# Patient Record
Sex: FEMALE | Race: WHITE | NOT HISPANIC OR LATINO | URBAN - METROPOLITAN AREA
[De-identification: names, ages, dates, MRNs, and addresses within clinical notes are randomized per-mention and may not be internally consistent; named-entity substitution may affect disease eponyms.]

---

## 2020-05-31 ENCOUNTER — AMBULATORY - HEALTHEAST (OUTPATIENT)
Dept: ADMINISTRATIVE | Facility: CLINIC | Age: 85
End: 2020-05-31

## 2020-05-31 RX ORDER — EZETIMIBE 10 MG/1
10 TABLET ORAL AT BEDTIME
Status: SHIPPED | COMMUNITY
Start: 2020-05-31

## 2020-05-31 RX ORDER — CITALOPRAM HYDROBROMIDE 20 MG/1
20 TABLET ORAL DAILY
Status: SHIPPED | COMMUNITY
Start: 2020-05-31

## 2020-05-31 RX ORDER — NITROGLYCERIN 0.4 MG/1
0.4 TABLET SUBLINGUAL EVERY 5 MIN PRN
Status: SHIPPED | COMMUNITY
Start: 2020-05-31

## 2020-05-31 RX ORDER — ASPIRIN 81 MG/1
81 TABLET, CHEWABLE ORAL DAILY
Status: SHIPPED | COMMUNITY
Start: 2020-05-31

## 2020-05-31 RX ORDER — NYSTATIN 100000/G
1 POWDER (GRAM) TOPICAL 2 TIMES DAILY
Status: SHIPPED | COMMUNITY
Start: 2020-05-31

## 2020-05-31 RX ORDER — LANOLIN ALCOHOL/MO/W.PET/CERES
3 CREAM (GRAM) TOPICAL AT BEDTIME
Status: SHIPPED | COMMUNITY
Start: 2020-05-31

## 2020-05-31 RX ORDER — ALBUTEROL SULFATE 90 UG/1
2 AEROSOL, METERED RESPIRATORY (INHALATION) EVERY 4 HOURS PRN
Status: SHIPPED | COMMUNITY
Start: 2020-05-31

## 2020-05-31 RX ORDER — METOPROLOL SUCCINATE 50 MG/1
100 TABLET, EXTENDED RELEASE ORAL AT BEDTIME
Status: SHIPPED | COMMUNITY
Start: 2020-05-31

## 2020-05-31 RX ORDER — INSULIN GLARGINE 100 [IU]/ML
40 INJECTION, SOLUTION SUBCUTANEOUS DAILY
Status: SHIPPED | COMMUNITY
Start: 2020-05-31

## 2020-05-31 RX ORDER — ACETAMINOPHEN 500 MG
1000 TABLET ORAL 2 TIMES DAILY
Status: SHIPPED | COMMUNITY
Start: 2020-05-31

## 2020-05-31 RX ORDER — AMLODIPINE BESYLATE 10 MG/1
10 TABLET ORAL DAILY
Status: SHIPPED | COMMUNITY
Start: 2020-05-31

## 2020-05-31 RX ORDER — CALCIUM CARBONATE 500(1250)
1 TABLET,CHEWABLE ORAL 2 TIMES DAILY
Status: SHIPPED | COMMUNITY
Start: 2020-05-31

## 2020-06-01 ENCOUNTER — OFFICE VISIT - HEALTHEAST (OUTPATIENT)
Dept: GERIATRICS | Facility: CLINIC | Age: 85
End: 2020-06-01

## 2020-06-01 DIAGNOSIS — I12.9 TYPE 2 DM WITH CKD STAGE 3 AND HYPERTENSION (H): ICD-10-CM

## 2020-06-01 DIAGNOSIS — E11.22 TYPE 2 DM WITH CKD STAGE 3 AND HYPERTENSION (H): ICD-10-CM

## 2020-06-01 DIAGNOSIS — G47.33 SEVERE OBSTRUCTIVE SLEEP APNEA: ICD-10-CM

## 2020-06-01 DIAGNOSIS — N18.30 TYPE 2 DM WITH CKD STAGE 3 AND HYPERTENSION (H): ICD-10-CM

## 2020-06-01 DIAGNOSIS — F33.9 RECURRENT MAJOR DEPRESSIVE DISORDER, REMISSION STATUS UNSPECIFIED (H): ICD-10-CM

## 2020-06-01 DIAGNOSIS — R41.0 DELIRIUM: ICD-10-CM

## 2020-06-01 DIAGNOSIS — E66.09 CLASS 1 OBESITY DUE TO EXCESS CALORIES WITH SERIOUS COMORBIDITY IN ADULT, UNSPECIFIED BMI: ICD-10-CM

## 2020-06-01 DIAGNOSIS — N18.30 CKD (CHRONIC KIDNEY DISEASE) STAGE 3, GFR 30-59 ML/MIN (H): ICD-10-CM

## 2020-06-01 DIAGNOSIS — Z86.73 HISTORY OF CVA (CEREBROVASCULAR ACCIDENT): ICD-10-CM

## 2020-06-01 DIAGNOSIS — E66.811 CLASS 1 OBESITY DUE TO EXCESS CALORIES WITH SERIOUS COMORBIDITY IN ADULT, UNSPECIFIED BMI: ICD-10-CM

## 2020-06-01 DIAGNOSIS — K21.9 GASTROESOPHAGEAL REFLUX DISEASE WITHOUT ESOPHAGITIS: ICD-10-CM

## 2020-06-01 DIAGNOSIS — M79.7 FIBROMYALGIA: ICD-10-CM

## 2020-06-01 DIAGNOSIS — R53.1 WEAKNESS: ICD-10-CM

## 2020-06-01 DIAGNOSIS — R29.6 FREQUENT FALLS: ICD-10-CM

## 2020-06-01 DIAGNOSIS — I25.10 CORONARY ARTERY DISEASE INVOLVING NATIVE CORONARY ARTERY OF NATIVE HEART WITHOUT ANGINA PECTORIS: ICD-10-CM

## 2020-06-01 DIAGNOSIS — I10 ESSENTIAL HYPERTENSION: ICD-10-CM

## 2020-06-02 ENCOUNTER — RECORDS - HEALTHEAST (OUTPATIENT)
Dept: LAB | Facility: CLINIC | Age: 85
End: 2020-06-02

## 2020-06-03 ENCOUNTER — COMMUNICATION - HEALTHEAST (OUTPATIENT)
Dept: GERIATRICS | Facility: CLINIC | Age: 85
End: 2020-06-03

## 2020-06-03 LAB
ANION GAP SERPL CALCULATED.3IONS-SCNC: 8 MMOL/L (ref 5–18)
BUN SERPL-MCNC: 31 MG/DL (ref 8–28)
CALCIUM SERPL-MCNC: 9.5 MG/DL (ref 8.5–10.5)
CHLORIDE BLD-SCNC: 107 MMOL/L (ref 98–107)
CO2 SERPL-SCNC: 25 MMOL/L (ref 22–31)
CREAT SERPL-MCNC: 1.53 MG/DL (ref 0.6–1.1)
GFR SERPL CREATININE-BSD FRML MDRD: 32 ML/MIN/1.73M2
GLUCOSE BLD-MCNC: 159 MG/DL (ref 70–125)
HGB BLD-MCNC: 11.9 G/DL (ref 12–16)
POTASSIUM BLD-SCNC: 4.1 MMOL/L (ref 3.5–5)
SODIUM SERPL-SCNC: 140 MMOL/L (ref 136–145)

## 2020-06-08 ENCOUNTER — OFFICE VISIT - HEALTHEAST (OUTPATIENT)
Dept: GERIATRICS | Facility: CLINIC | Age: 85
End: 2020-06-08

## 2020-06-08 DIAGNOSIS — I25.10 CORONARY ARTERY DISEASE INVOLVING NATIVE CORONARY ARTERY OF NATIVE HEART WITHOUT ANGINA PECTORIS: ICD-10-CM

## 2020-06-08 DIAGNOSIS — R29.6 FREQUENT FALLS: ICD-10-CM

## 2020-06-08 DIAGNOSIS — R53.1 WEAKNESS: ICD-10-CM

## 2020-06-08 DIAGNOSIS — I12.9 TYPE 2 DM WITH CKD STAGE 3 AND HYPERTENSION (H): ICD-10-CM

## 2020-06-08 DIAGNOSIS — I10 ESSENTIAL HYPERTENSION: ICD-10-CM

## 2020-06-08 DIAGNOSIS — E11.22 TYPE 2 DM WITH CKD STAGE 3 AND HYPERTENSION (H): ICD-10-CM

## 2020-06-08 DIAGNOSIS — N18.30 CKD (CHRONIC KIDNEY DISEASE) STAGE 3, GFR 30-59 ML/MIN (H): ICD-10-CM

## 2020-06-08 DIAGNOSIS — N18.30 TYPE 2 DM WITH CKD STAGE 3 AND HYPERTENSION (H): ICD-10-CM

## 2020-06-08 DIAGNOSIS — G47.33 SEVERE OBSTRUCTIVE SLEEP APNEA: ICD-10-CM

## 2020-06-08 DIAGNOSIS — F33.9 RECURRENT MAJOR DEPRESSIVE DISORDER, REMISSION STATUS UNSPECIFIED (H): ICD-10-CM

## 2020-06-09 ENCOUNTER — COMMUNICATION - HEALTHEAST (OUTPATIENT)
Dept: GERIATRICS | Facility: CLINIC | Age: 85
End: 2020-06-09

## 2020-06-09 RX ORDER — POLYETHYLENE GLYCOL 3350 17 G/17G
17 POWDER, FOR SOLUTION ORAL DAILY
Status: SHIPPED | COMMUNITY
Start: 2020-06-09

## 2020-06-11 ENCOUNTER — AMBULATORY - HEALTHEAST (OUTPATIENT)
Dept: ADMINISTRATIVE | Facility: CLINIC | Age: 85
End: 2020-06-11

## 2020-06-11 ENCOUNTER — OFFICE VISIT - HEALTHEAST (OUTPATIENT)
Dept: GERIATRICS | Facility: CLINIC | Age: 85
End: 2020-06-11

## 2020-06-11 DIAGNOSIS — N18.30 CKD (CHRONIC KIDNEY DISEASE) STAGE 3, GFR 30-59 ML/MIN (H): ICD-10-CM

## 2020-06-11 DIAGNOSIS — E11.22 TYPE 2 DM WITH CKD STAGE 3 AND HYPERTENSION (H): ICD-10-CM

## 2020-06-11 DIAGNOSIS — F33.9 RECURRENT MAJOR DEPRESSIVE DISORDER, REMISSION STATUS UNSPECIFIED (H): ICD-10-CM

## 2020-06-11 DIAGNOSIS — I12.9 TYPE 2 DM WITH CKD STAGE 3 AND HYPERTENSION (H): ICD-10-CM

## 2020-06-11 DIAGNOSIS — Z86.73 HISTORY OF CVA (CEREBROVASCULAR ACCIDENT): ICD-10-CM

## 2020-06-11 DIAGNOSIS — G47.33 SEVERE OBSTRUCTIVE SLEEP APNEA: ICD-10-CM

## 2020-06-11 DIAGNOSIS — R29.6 FREQUENT FALLS: ICD-10-CM

## 2020-06-11 DIAGNOSIS — I25.10 CORONARY ARTERY DISEASE INVOLVING NATIVE CORONARY ARTERY OF NATIVE HEART WITHOUT ANGINA PECTORIS: ICD-10-CM

## 2020-06-11 DIAGNOSIS — N18.30 TYPE 2 DM WITH CKD STAGE 3 AND HYPERTENSION (H): ICD-10-CM

## 2020-06-11 DIAGNOSIS — I10 ESSENTIAL HYPERTENSION: ICD-10-CM

## 2020-06-11 DIAGNOSIS — R53.1 WEAKNESS: ICD-10-CM

## 2020-06-11 DIAGNOSIS — K21.9 GASTROESOPHAGEAL REFLUX DISEASE WITHOUT ESOPHAGITIS: ICD-10-CM

## 2020-06-15 ENCOUNTER — AMBULATORY - HEALTHEAST (OUTPATIENT)
Dept: GERIATRICS | Facility: CLINIC | Age: 85
End: 2020-06-15

## 2020-07-31 ENCOUNTER — COMMUNICATION - HEALTHEAST (OUTPATIENT)
Dept: SCHEDULING | Facility: CLINIC | Age: 85
End: 2020-07-31

## 2020-08-18 ENCOUNTER — RECORDS - HEALTHEAST (OUTPATIENT)
Dept: LAB | Facility: CLINIC | Age: 85
End: 2020-08-18

## 2020-08-19 LAB
ANION GAP SERPL CALCULATED.3IONS-SCNC: 10 MMOL/L (ref 5–18)
BUN SERPL-MCNC: 58 MG/DL (ref 8–28)
CALCIUM SERPL-MCNC: 10.3 MG/DL (ref 8.5–10.5)
CHLORIDE BLD-SCNC: 104 MMOL/L (ref 98–107)
CO2 SERPL-SCNC: 25 MMOL/L (ref 22–31)
CREAT SERPL-MCNC: 2.4 MG/DL (ref 0.6–1.1)
ERYTHROCYTE [DISTWIDTH] IN BLOOD BY AUTOMATED COUNT: 16.3 % (ref 11–14.5)
GFR SERPL CREATININE-BSD FRML MDRD: 19 ML/MIN/1.73M2
GLUCOSE BLD-MCNC: 221 MG/DL (ref 70–125)
HCT VFR BLD AUTO: 33.2 % (ref 35–47)
HGB BLD-MCNC: 10.5 G/DL (ref 12–16)
MCH RBC QN AUTO: 29.9 PG (ref 27–34)
MCHC RBC AUTO-ENTMCNC: 31.6 G/DL (ref 32–36)
MCV RBC AUTO: 95 FL (ref 80–100)
PLATELET # BLD AUTO: 214 THOU/UL (ref 140–440)
PMV BLD AUTO: 11 FL (ref 8.5–12.5)
POTASSIUM BLD-SCNC: 4.4 MMOL/L (ref 3.5–5)
RBC # BLD AUTO: 3.51 MILL/UL (ref 3.8–5.4)
SODIUM SERPL-SCNC: 139 MMOL/L (ref 136–145)
WBC: 6.7 THOU/UL (ref 4–11)

## 2020-11-03 ENCOUNTER — RECORDS - HEALTHEAST (OUTPATIENT)
Dept: LAB | Facility: CLINIC | Age: 85
End: 2020-11-03

## 2020-11-04 LAB
ANION GAP SERPL CALCULATED.3IONS-SCNC: 10 MMOL/L (ref 5–18)
BUN SERPL-MCNC: 63 MG/DL (ref 8–28)
CALCIUM SERPL-MCNC: 11 MG/DL (ref 8.5–10.5)
CHLORIDE BLD-SCNC: 106 MMOL/L (ref 98–107)
CO2 SERPL-SCNC: 23 MMOL/L (ref 22–31)
CREAT SERPL-MCNC: 2.39 MG/DL (ref 0.6–1.1)
ERYTHROCYTE [DISTWIDTH] IN BLOOD BY AUTOMATED COUNT: 14.6 % (ref 11–14.5)
GFR SERPL CREATININE-BSD FRML MDRD: 19 ML/MIN/1.73M2
GLUCOSE BLD-MCNC: 114 MG/DL (ref 70–125)
HCT VFR BLD AUTO: 34.1 % (ref 35–47)
HGB BLD-MCNC: 10.9 G/DL (ref 12–16)
MCH RBC QN AUTO: 30.6 PG (ref 27–34)
MCHC RBC AUTO-ENTMCNC: 32 G/DL (ref 32–36)
MCV RBC AUTO: 96 FL (ref 80–100)
PLATELET # BLD AUTO: 216 THOU/UL (ref 140–440)
PMV BLD AUTO: 11 FL (ref 8.5–12.5)
POTASSIUM BLD-SCNC: 4.4 MMOL/L (ref 3.5–5)
RBC # BLD AUTO: 3.56 MILL/UL (ref 3.8–5.4)
SODIUM SERPL-SCNC: 139 MMOL/L (ref 136–145)
WBC: 7.2 THOU/UL (ref 4–11)

## 2020-11-25 ENCOUNTER — RECORDS - HEALTHEAST (OUTPATIENT)
Dept: LAB | Facility: CLINIC | Age: 85
End: 2020-11-25

## 2020-11-27 LAB
ANION GAP SERPL CALCULATED.3IONS-SCNC: 10 MMOL/L (ref 5–18)
BUN SERPL-MCNC: 60 MG/DL (ref 8–28)
CALCIUM SERPL-MCNC: 9.7 MG/DL (ref 8.5–10.5)
CHLORIDE BLD-SCNC: 108 MMOL/L (ref 98–107)
CO2 SERPL-SCNC: 23 MMOL/L (ref 22–31)
CREAT SERPL-MCNC: 2.54 MG/DL (ref 0.6–1.1)
ERYTHROCYTE [DISTWIDTH] IN BLOOD BY AUTOMATED COUNT: 14.8 % (ref 11–14.5)
GFR SERPL CREATININE-BSD FRML MDRD: 18 ML/MIN/1.73M2
GLUCOSE BLD-MCNC: 88 MG/DL (ref 70–125)
HCT VFR BLD AUTO: 29.3 % (ref 35–47)
HGB BLD-MCNC: 9.5 G/DL (ref 12–16)
MCH RBC QN AUTO: 30.8 PG (ref 27–34)
MCHC RBC AUTO-ENTMCNC: 32.4 G/DL (ref 32–36)
MCV RBC AUTO: 95 FL (ref 80–100)
PLATELET # BLD AUTO: 173 THOU/UL (ref 140–440)
PMV BLD AUTO: 10.7 FL (ref 8.5–12.5)
POTASSIUM BLD-SCNC: 4.2 MMOL/L (ref 3.5–5)
RBC # BLD AUTO: 3.08 MILL/UL (ref 3.8–5.4)
SODIUM SERPL-SCNC: 141 MMOL/L (ref 136–145)
WBC: 5.6 THOU/UL (ref 4–11)

## 2020-12-28 ENCOUNTER — RECORDS - HEALTHEAST (OUTPATIENT)
Dept: LAB | Facility: CLINIC | Age: 85
End: 2020-12-28

## 2020-12-29 LAB
ANION GAP SERPL CALCULATED.3IONS-SCNC: 9 MMOL/L (ref 5–18)
BUN SERPL-MCNC: 73 MG/DL (ref 8–28)
CALCIUM SERPL-MCNC: 10.2 MG/DL (ref 8.5–10.5)
CHLORIDE BLD-SCNC: 107 MMOL/L (ref 98–107)
CO2 SERPL-SCNC: 23 MMOL/L (ref 22–31)
CREAT SERPL-MCNC: 2.47 MG/DL (ref 0.6–1.1)
ERYTHROCYTE [DISTWIDTH] IN BLOOD BY AUTOMATED COUNT: 14.4 % (ref 11–14.5)
FERRITIN SERPL-MCNC: 61 NG/ML (ref 10–130)
GFR SERPL CREATININE-BSD FRML MDRD: 19 ML/MIN/1.73M2
GLUCOSE BLD-MCNC: 151 MG/DL (ref 70–125)
HCT VFR BLD AUTO: 29.6 % (ref 35–47)
HGB BLD-MCNC: 9.7 G/DL (ref 12–16)
IRON SERPL-MCNC: 61 UG/DL (ref 42–175)
MCH RBC QN AUTO: 31.4 PG (ref 27–34)
MCHC RBC AUTO-ENTMCNC: 32.8 G/DL (ref 32–36)
MCV RBC AUTO: 96 FL (ref 80–100)
PLATELET # BLD AUTO: 183 THOU/UL (ref 140–440)
PMV BLD AUTO: 10.8 FL (ref 8.5–12.5)
POTASSIUM BLD-SCNC: 4.1 MMOL/L (ref 3.5–5)
RBC # BLD AUTO: 3.09 MILL/UL (ref 3.8–5.4)
SODIUM SERPL-SCNC: 139 MMOL/L (ref 136–145)
WBC: 5.9 THOU/UL (ref 4–11)

## 2021-04-27 ENCOUNTER — RECORDS - HEALTHEAST (OUTPATIENT)
Dept: LAB | Facility: CLINIC | Age: 86
End: 2021-04-27

## 2021-04-29 LAB
ANION GAP SERPL CALCULATED.3IONS-SCNC: 11 MMOL/L (ref 5–18)
BUN SERPL-MCNC: 54 MG/DL (ref 8–28)
CALCIUM SERPL-MCNC: 10 MG/DL (ref 8.5–10.5)
CHLORIDE BLD-SCNC: 103 MMOL/L (ref 98–107)
CO2 SERPL-SCNC: 27 MMOL/L (ref 22–31)
CREAT SERPL-MCNC: 3.11 MG/DL (ref 0.6–1.1)
GFR SERPL CREATININE-BSD FRML MDRD: 14 ML/MIN/1.73M2
GLUCOSE BLD-MCNC: 110 MG/DL (ref 70–125)
POTASSIUM BLD-SCNC: 4.1 MMOL/L (ref 3.5–5)
SODIUM SERPL-SCNC: 141 MMOL/L (ref 136–145)

## 2021-05-27 VITALS
HEART RATE: 71 BPM | DIASTOLIC BLOOD PRESSURE: 63 MMHG | TEMPERATURE: 98.2 F | RESPIRATION RATE: 18 BRPM | OXYGEN SATURATION: 96 % | SYSTOLIC BLOOD PRESSURE: 195 MMHG

## 2021-05-30 ENCOUNTER — RECORDS - HEALTHEAST (OUTPATIENT)
Dept: ADMINISTRATIVE | Facility: CLINIC | Age: 86
End: 2021-05-30

## 2021-06-02 ENCOUNTER — RECORDS - HEALTHEAST (OUTPATIENT)
Dept: ADMINISTRATIVE | Facility: CLINIC | Age: 86
End: 2021-06-02

## 2021-06-04 VITALS
HEART RATE: 70 BPM | RESPIRATION RATE: 18 BRPM | TEMPERATURE: 97.5 F | OXYGEN SATURATION: 97 % | WEIGHT: 220 LBS | DIASTOLIC BLOOD PRESSURE: 60 MMHG | SYSTOLIC BLOOD PRESSURE: 165 MMHG

## 2021-06-04 VITALS
TEMPERATURE: 98.2 F | RESPIRATION RATE: 18 BRPM | HEART RATE: 64 BPM | SYSTOLIC BLOOD PRESSURE: 174 MMHG | OXYGEN SATURATION: 96 % | WEIGHT: 222 LBS | DIASTOLIC BLOOD PRESSURE: 74 MMHG

## 2021-06-08 NOTE — TELEPHONE ENCOUNTER
[facility] Nurse Triage Telephone Note      Provider: FERNANDO Junior  Facility: [facility]    Facility Type: U    Caller: Trisha  Call Back Number:  [phone]    Allergies: Duloxetine; Exenatide; Lisinopril; Pregabalin; Sulfa (sulfonamide antibiotics); and Tetracyclines    Reason for call: Hgb 11.9, BUN 31, Cr 1.53, GFR 32     Verbal Order/Direction given by Provider: LISA    Provider giving order: FERNANDO Junior    Verbal order given to: Trisha Starr RN

## 2021-06-08 NOTE — TELEPHONE ENCOUNTER
"[facility] Nurse Triage Telephone Note      Provider: FERNANDO Junior  Facility: [facility]    Facility Type: TCU    Caller: Trisha  Call Back Number:  [phone]    Allergies: Duloxetine; Exenatide; Lisinopril; Pregabalin; Sulfa (sulfonamide antibiotics); and Tetracyclines    Reason for call: 7:25am /73 given scheduled Norvasc 10mg po. Recheck BP 1053 181/68, pt attended therapy and no complaints other than is constipated. Last BM Fri 6/5. \"I have stopped eating fruit & think that is the cause.\" Not on any narcotics or laxatives. Discovered when parameters added yesterday to her Toprol XL 100mg q hs to hold if SBP<100 or HR<60 that they entered the state date for today, so she missed her dose last HS.     Verbal Order/Direction given by Provider: Give tonight's scheduled Toprol XL dose now-early and don't give another dose tonight. Miralax 17GM[po daily, hold if LS. Okay for pt to eat fresh fruits, just not to drink juices & preferably not canned fruits.    Provider giving order: CARLIN Whelan    Verbal order given to: Trisha Starr RN      "

## 2021-06-08 NOTE — PROGRESS NOTES
[facility]    DATE: 2020    NAME:  Marcie Duarte             :  1935  MRN: [id number]  CODE STATUS:  DNR/DNI    VISIT TYPE: H & P (AMS, fall, hypertension)     FACILITY:  [facility] [[id number]]       CHIEF COMPLAIN/REASON FOR VISIT:    Chief Complaint   Patient presents with     H & P     AMS, fall, hypertension               HISTORY OF PRESENT ILLNESS: Marcie Duarte is a 84 y.o. female who was admitted to [facility] - for worsening confusion, altered mental status. Cognitive decline had been noted progressively over last few months. She was confused and mismanaging her medications as well as diabetes. Family was unsure if she was wearing her cpap. Family had been working on moving into higher level of care. The night prior to admission she had been hallucinating that there were people in her apartment moving around furniture and painting her walls. She was progressively weaker and was brought to [place] ED. She did have MARIANO at [place] ED and family decided to take her home instead of transferring to a tertiary  Setting. After returning home she wandered outside and fell. She was then transferred to [facility] ED and admitted. She was treated for delirium with hallucinations which improved during hospital course. MRI brain was negative and infection workup  Negative. Psych was consulted and did recommend empiric thiamine and prn medications. She was treated for MARIANO on CKD and taken off losartan. Her plavix was changed to brilinta. HGa1c was 6.5 and diabetes regimen was adjusted. She was recommended TCU transfer and transferred to [place]. She has PMH of CVA, CAD s/p  PCI 2020, HTN, type 2 DM, CKD stage 3, fibromyalgia, CARMEN. Prior to this she lived in senior independent living.     Today Ms. Duarte is seen for hospital follow up and admission to TCU visit after AMS, fall, and now having hypertension. Per nursing her blood pressures  have been elevated the last few days in  160-190s. She has been complaining of headaches and was given a standing house order for tylenol last evening. On exam she is seen in  Her room alone. She says she had a shower with OT this morning and that went well. She is having some stomach pain right now but otherwise just her usual  Aches and pains. She has rheumatoid arthritis and fibromyalgia so she is always aching. She says she takes tylenol usually twice a day at home. She is having regular bowel movements and needs to have one right now. She is not having any  Shortness of breath other than normal for her with  Exertion. She denies urinary trouble. She has a little cough but says the CPAP she wore in the hospital dried her out and made her cough. She is having intermittent headaches but not sure if it is related to her blood pressure. She does get these at times at home as well. The tylenol usually  Takes care of it for her. She says she is not having any memory issues now and that she feels more clear than she did before. She reports having a CVA a few years ago and has had some trouble with words since then but not anything more than usual now. She denies having any injury from her fall. She says she tries to check her blood sugar once or so a day at home. We discussed code status and she is a DNR. She is a former smoker and denies drinking alcohol more than on occasion. We reviewed her meds and she denies any concerns with them today. She is sleeping ok.     REVIEW OF SYSTEMS:  PROBLEMS AND REVIEW OF SYSTEMS:   Today on ROS:   Currently, no fever, chills, or rigors. Decreased vision and hearing. Denies any chest pain, headaches, palpitations, lightheadedness. Appetite is good. Denies any difficulty with swallowing, nausea, or vomiting.  Denies any abdominal pain, diarrhea or constipation. Denies any urinary symptoms. No insomnia. Positive for weakness, recent fall, forgetful at times but improving, reports  confusion improving, shortness of breath with exertion at baseline, dry cough, stomach pain on occasion, elevated blood pressures, reflux intermittently      Allergies   Allergen Reactions     Duloxetine Cough     Exenatide Nausea Only     Lisinopril Cough     Pregabalin      Other reaction(s): Gastrointestinal     Sulfa (Sulfonamide Antibiotics)      Tetracyclines Hives     Current Outpatient Medications   Medication Sig     acetaminophen (TYLENOL) 500 MG tablet Take 500 mg by mouth 2 (two) times a day. And daily p rn     albuterol (PROAIR HFA;PROVENTIL HFA;VENTOLIN HFA) 90 mcg/actuation inhaler Inhale 2 puffs every 4 (four) hours as needed for wheezing.     amLODIPine (NORVASC) 10 MG tablet Take 10 mg by mouth daily.     aspirin 81 mg chewable tablet Chew 81 mg daily.     calcium carbonate (OS-MICHAEL) 500 mg calcium (1,250 mg) chewable tablet Chew 1 tablet 2 (two) times a day.     citalopram (CELEXA) 20 MG tablet Take 20 mg by mouth daily.     ezetimibe (ZETIA) 10 mg tablet Take 10 mg by mouth at bedtime.     insulin aspart U-100 (NOVOLOG) 100 unit/mL injection Inject under the skin. 8 units at breakfast, 4 units at lunch, and 7 units at dinner     insulin glargine (LANTUS) 100 unit/mL vial Inject 25 Units under the skin daily. At noon     melatonin 3 mg Tab tablet Take 3 mg by mouth at bedtime.     metoprolol succinate (TOPROL-XL) 50 MG 24 hr tablet Take 100 mg by mouth at bedtime.      nitroglycerin (NITROSTAT) 0.4 MG SL tablet Place 0.4 mg under the tongue every 5 (five) minutes as needed for chest pain.     nystatin (MYCOSTATIN) powder Apply 1 application topically 2 (two) times a day. Apply to bilateral breast and lower abdomen     OLANZapine (ZYPREXA) 2.5 MG tablet Take 2.5 mg by mouth every 6 (six) hours as needed.     ticagrelor (BRILINTA) 90 mg Tab Take 90 mg by mouth 2 (two) times a day.     Past Medical History:    Past Medical History:   Diagnosis Date     Abdominal hernia      Acute kidney failure (H)       AMS (altered mental status)      CAD (coronary artery disease)      Cardiomegaly      CKD (chronic kidney disease) stage 3, GFR 30-59 ml/min (H)      Colon polyps      Dementia (H)      Depression      DM2 (diabetes mellitus, type 2) (H)      Fibromyalgia      GERD (gastroesophageal reflux disease)      HLD (hyperlipidemia)      HTN (hypertension)      Macular degeneration      Morbid obesity (H)      CARMEN (obstructive sleep apnea)      Repeated falls      Sleep disturbance      TIA (transient ischemic attack)            PHYSICAL EXAMINATION  Vitals:    06/01/20 0700   BP: (!) 195/63   Pulse: 71   Resp: 18   Temp: 98.2  F (36.8  C)   SpO2: 96%       Today on physical exam:     GENERAL: Awake, Alert, oriented x3, not in any form of acute distress, answers questions appropriately, follows simple commands, conversant, obese  HEENT: Head is normocephalic with normal hair distribution. No evidence of trauma. Ears: No acute purulent discharge. Eyes: Conjunctivae pink with no scleral jaundice. Nose: Normal mucosa and septum. NECK: Supple with no cervical or supraclavicular lymphadenopathy. Trachea is midline. Decreased vision and hearing  CHEST: No tenderness or deformity, no crepitus  LUNG: dim to auscultation with good chest expansion. There are no crackles or wheezes, normal AP diameter.  BACK: No kyphosis of the thoracic spine. Symmetric, no curvature, ROM normal, no CVA tenderness, no spinal tenderness   CVS: There is good S1  S2, regular rhythm, there are no murmurs, rubs, gallops, or heaves,  2+ pulses symmetric in all extremities.  ABDOMEN: obese and soft, nontender to palpation, non distended, no masses, no organomegaly, good bowel sounds, no rebound or guarding, no peritoneal signs.   EXTREMITIES: No pedal edema, Atraumatic. Full range of motion on both upper and lower extremities, there is no tenderness to palpation, no cyanosis or clubbing, no calf tenderness.  Pulses equal in all extremities, normal cap  refill, no joint swelling.  SKIN: Warm and dry, no erythema noted.  Skin color, texture, no rashes or lesions.  NEUROLOGICAL: The patient is oriented to person, place and time, forgetful at times, confusion  improving            LABS:   No results found for this or any previous visit (from the past 168 hour(s)).  No results found for this or any previous visit.      No results found for: WBC, HGB, HCT, MCV, PLT    No results found for: [id number]  No results found for: HGBA1C  No results found for: INR, PROTIME  No results found for: [id number]  No results found for: TSH        ASSESSMENT/PLAN:    Altered mental status, delirium with hallucinations: appears resolved. A/o today, no hallucinations noted. Forgetful at times. Will have therapy  Perform cognitive testing as may have some underlying cognitive impairment. zyprexa prn. No behavior concerns today.   Weakness, Fall: PT and OT following. Fall prevention protocol discussed with  Nursing.   HTN: -190s. Losartan was discontinued in hospital, may need to consider restarting. For now will continue amlodipine, increase metoprolol to 100mg. Added hold parameters as well. Discussed with her and nursing the changes made today and when to notify provider.  Type 2 DM: Accuchecks 125-290. DC sliding scale, keep meal dosing insulin for now. On lantus 25u daily.  Will discuss further this with her to see if this is new or not and if this is reasonable management for home. hga1c 6.5.   CAD: recent PCI in February. Was on plavix now changed to brilinta. On aspirin, zetia, nitrostat prn.   CKD stage 3: bmp ordered for 6/3.   Obesity: counseling provided  OA, fibromyalgia, headaches: reports intermittent pain, takes tylenol twice daily  At home. Discussed pain management with her. Will change order to two times a day and daily p rn pain. Dc MERISSA tylenol, max of 3gm/24 hours from all sources/orders.   H/o CVA: issues with word finding at times. No other deficits noted.    Depression, anxiety: on citalopram. No concerns today. zyprexa prn.   Insomnia: on melatonin at bedtime.   GERD: mylanta prn.   CARMEN: encourage compliance with CPAP.     Electronically signed by: Jennifer Lundy NP    Total floor/unit time spent 45 min with >50% time spent on counseling and coordination of care. Counseling was done regarding hypertension management, pain management. Coordinated care with  Nursing for management of hypertension, fall prevention protocol.

## 2021-06-08 NOTE — PROGRESS NOTES
[facility]    DATE: 2020    NAME:  Marcie Duarte             :  1935  MRN: [id number]  CODE STATUS:  DNR/DNI    VISIT TYPE: H & P (readmission after chest pain, CAD)     FACILITY:  [facility] [[id number]]       CHIEF COMPLAIN/REASON FOR VISIT:    Chief Complaint   Patient presents with     H & P     readmission after chest pain, CAD               HISTORY OF PRESENT ILLNESS: Marcie Duarte is a 84 y.o. female who was admitted to [facility] - for worsening confusion, altered mental status. Cognitive decline had been noted progressively over last few months. She was confused and mismanaging her medications as well as diabetes. Family was unsure if she was wearing her cpap. Family had been working on moving into higher level of care. The night prior to admission she had been hallucinating that there were people in her apartment moving around furniture and painting her walls. She was progressively weaker and was brought to [place] [facility]. She did have MARIANO at [place] [facility] and family decided to take her home instead of transferring to a tertiary  Setting. After returning home she wandered outside and fell. She was then transferred to [facility] ED and admitted. She was treated for delirium with hallucinations which improved during hospital course. MRI brain was negative and infection workup  Negative. Psych was consulted and did recommend empiric thiamine and prn medications. She was treated for MARIANO on CKD and taken off losartan. Her plavix was changed to brilinta. HGa1c was 6.5 and diabetes regimen was adjusted. She was recommended TCU transfer and transferred to [place]. She has PMH of CVA, CAD s/p  PCI 2020, HTN, type 2 DM, CKD stage 3, fibromyalgia, CARMEN. Prior to this she lived in senior independent living. She was sent to [facility] ED on  6/3 for chest pain and transferred to [facility] for admission 6/3-. Her SBP was in 200s and chest  pain did improve some with nitro. troponins were negative and EKG was stable. She did undergo Lexiscan stress test which was negative. She was discharged back to TCU after no recurrence of chest pain.     Today Ms. Duarte is seen today in her room alone. She says she is doing fine today. She has not had any more chest pain. She is sleeping fine and thinks her mind is clearing up. She does not feel she is hallucinating or having any more personality changes. She is convinced it was that medication like her kids thought. She says her bowels seem to be fine and her appetite is good. She is not having any dizziness or stomach upset problems. We reviewed her med list and no concerns. She says she used to check her blood sugar a couple times a day at home and would give herself a set amount of insulin. She does admit she was not good at being regular about this. She also admits she mixed up her long acting insulin a few times and was not giving herself the correct doses. She denies other issues. She does have chronic aches and pains but nothing more than usual for her.     REVIEW OF SYSTEMS:  PROBLEMS AND REVIEW OF SYSTEMS:   Today on ROS:   Currently, no fever, chills, or rigors. Decreased vision and hearing. Denies any chest pain, headaches, palpitations, lightheadedness. Appetite is good. Denies any difficulty with swallowing, nausea, or vomiting.  Denies any abdominal pain, diarrhea or constipation. Denies any urinary symptoms. No insomnia. Positive for forgetful at times, shortness of breath with exertion at baseline, dry cough, no recent chest pain      Allergies   Allergen Reactions     Duloxetine Cough     Exenatide Nausea Only     Lisinopril Cough     Pregabalin      Other reaction(s): Gastrointestinal     Sulfa (Sulfonamide Antibiotics)      Tetracyclines Hives     Current Outpatient Medications   Medication Sig     acetaminophen (TYLENOL) 500 MG tablet Take 500 mg by mouth 2 (two) times a day. And daily p rn      albuterol (PROAIR HFA;PROVENTIL HFA;VENTOLIN HFA) 90 mcg/actuation inhaler Inhale 2 puffs every 4 (four) hours as needed for wheezing.     amLODIPine (NORVASC) 10 MG tablet Take 10 mg by mouth daily.     aspirin 81 mg chewable tablet Chew 81 mg daily.     calcium carbonate (OS-MICHAEL) 500 mg calcium (1,250 mg) chewable tablet Chew 1 tablet 2 (two) times a day.     citalopram (CELEXA) 20 MG tablet Take 20 mg by mouth daily.     ezetimibe (ZETIA) 10 mg tablet Take 10 mg by mouth at bedtime.     insulin aspart U-100 (NOVOLOG) 100 unit/mL injection Inject under the skin. 8 units at breakfast, 4 units at lunch, and 7 units at dinner     insulin glargine (LANTUS) 100 unit/mL vial Inject 27 Units under the skin daily. At noon      melatonin 3 mg Tab tablet Take 3 mg by mouth at bedtime.     metoprolol succinate (TOPROL-XL) 50 MG 24 hr tablet Take 100 mg by mouth at bedtime.      nitroglycerin (NITROSTAT) 0.4 MG SL tablet Place 0.4 mg under the tongue every 5 (five) minutes as needed for chest pain.     nystatin (MYCOSTATIN) powder Apply 1 application topically 2 (two) times a day. Apply to bilateral breast and lower abdomen     OLANZapine (ZYPREXA) 2.5 MG tablet Take 2.5 mg by mouth every 6 (six) hours as needed.     ticagrelor (BRILINTA) 90 mg Tab Take 90 mg by mouth 2 (two) times a day.     Past Medical History:    Past Medical History:   Diagnosis Date     Abdominal hernia      Acute kidney failure (H)      AMS (altered mental status)      CAD (coronary artery disease)      Cardiomegaly      CKD (chronic kidney disease) stage 3, GFR 30-59 ml/min (H)      Colon polyps      Dementia (H)      Depression      DM2 (diabetes mellitus, type 2) (H)      Fibromyalgia      GERD (gastroesophageal reflux disease)      HLD (hyperlipidemia)      HTN (hypertension)      Macular degeneration      Morbid obesity (H)      CARMEN (obstructive sleep apnea)      Repeated falls      Sleep disturbance      TIA (transient ischemic attack)             PHYSICAL EXAMINATION  Vitals:    06/08/20 0700   BP: 165/60   Pulse: 70   Resp: 18   Temp: 97.5  F (36.4  C)   SpO2: 97%   Weight: 220 lb (99.8 kg)       Today on physical exam:     GENERAL: Awake, Alert, oriented x3, not in any form of acute distress, answers questions appropriately, follows simple commands, conversant, obese  HEENT: Head is normocephalic with normal hair distribution. No evidence of trauma. Ears: No acute purulent discharge. Eyes: Conjunctivae pink with no scleral jaundice. Nose: Normal mucosa and septum. NECK: Supple with no cervical or supraclavicular lymphadenopathy. Trachea is midline. Decreased vision and hearing  CHEST: No tenderness or deformity, no crepitus  LUNG: dim to auscultation with good chest expansion. There are no crackles or wheezes, normal AP diameter.  BACK: No kyphosis of the thoracic spine. Symmetric, no curvature, ROM normal, no CVA tenderness, no spinal tenderness   CVS: There is good S1  S2, regular rhythm, there are no murmurs, rubs, gallops, or heaves,  2+ pulses symmetric in all extremities.  ABDOMEN: obese and soft, nontender to palpation, non distended, no masses, no organomegaly, good bowel sounds, no rebound or guarding, no peritoneal signs.   EXTREMITIES: No pedal edema, Atraumatic. Full range of motion on both upper and lower extremities, there is no tenderness to palpation, no cyanosis or clubbing, no calf tenderness.  Pulses equal in all extremities, normal cap refill, no joint swelling.  SKIN: Warm and dry, no erythema noted.  Skin color, texture, no rashes or lesions.  NEUROLOGICAL: The patient is oriented to person, place and time, forgetful at times            LABS:   Recent Results (from the past 168 hour(s))   Basic Metabolic Panel   Result Value Ref Range    Sodium 140 136 - 145 mmol/L    Potassium 4.1 3.5 - 5.0 mmol/L    Chloride 107 98 - 107 mmol/L    CO2 25 22 - 31 mmol/L    Anion Gap, Calculation 8 5 - 18 mmol/L    Glucose 159 (H) 70 - 125  mg/dL    Calcium 9.5 8.5 - 10.5 mg/dL    BUN 31 (H) 8 - 28 mg/dL    Creatinine 1.53 (H) 0.60 - 1.10 mg/dL    GFR MDRD Af Amer 39 (L) >60 mL/min/1.73m2    GFR MDRD Non Af Amer 32 (L) >60 mL/min/1.73m2   Hemoglobin   Result Value Ref Range    Hemoglobin 11.9 (L) 12.0 - 16.0 g/dL     Results for orders placed or performed in visit on 06/03/20   Basic Metabolic Panel   Result Value Ref Range    Sodium 140 136 - 145 mmol/L    Potassium 4.1 3.5 - 5.0 mmol/L    Chloride 107 98 - 107 mmol/L    CO2 25 22 - 31 mmol/L    Anion Gap, Calculation 8 5 - 18 mmol/L    Glucose 159 (H) 70 - 125 mg/dL    Calcium 9.5 8.5 - 10.5 mg/dL    BUN 31 (H) 8 - 28 mg/dL    Creatinine 1.53 (H) 0.60 - 1.10 mg/dL    GFR MDRD Af Amer 39 (L) >60 mL/min/1.73m2    GFR MDRD Non Af Amer 32 (L) >60 mL/min/1.73m2         Lab Results   Component Value Date    HGB 11.9 (L) 06/03/2020       No results found for: [id number]  No results found for: HGBA1C  No results found for: INR, PROTIME  No results found for: [id number]  No results found for: TSH        ASSESSMENT/PLAN:    Chest pain, CAD: chest pain resolved with management of hypertension. No recurrence. troponins negative. Did have recent PCI in February. Now on aspirin, zetia, brilinta, nitrostat prn. F/u with cardiology.   Weakness, Fall: PT and OT following. No  Recent falls. Improving with therapy.   HTN: SBP 160s. on amlodipine, metoprolol. Hold parameters. Recently stopped losartan, may need to restart.   Type 2 DM: Accuchecks 227-303. on sliding scale, keep meal dosing insulin for now. On lantus 25u daily.  Reports checking her blood sugar maybe twice a day at home, gave self a set amount of insulin, mixing up meds at home. hga1c 6.5. denies following diabetic diet  Will keep current regimen and dc sliding scale when more stable. Depending on discharge plans may need a safer regimen for home. Increase lantus to 27u due to hyperglycemia. Discussed with nursing when to notify for hypo or  hyperglycemia.   CKD stage 3: cr 1.53 and gfr 36 on 6/4. Stable.   Obesity: counseling provided on improving healthier eating habits and increasing physical activity.   OA, fibromyalgia, headaches: reports intermittent pain, takes tylenol twice daily  At home. Changed tylenol back to two times a day and daily prn.   H/o CVA: issues with word finding at times. No other deficits noted.   Depression, anxiety: on citalopram. No concerns today. zyprexa prn.   Insomnia: on melatonin at bedtime.   GERD: mylanta prn.   CARMEN: encourage compliance with CPAP.   Mild cognitive impairment: appears delirium and hallucinations now resolved. Not seeing people anymore. Appears more alert and oriented today. Remains forgetful at times. zyprexa prn, will dc prior to discharge if not needing.   Candidal intertrigo: nursing reports rash under bilateral breasts. Discussed with nursing and will order Nystatin two times a day until resolved.     Electronically signed by: Jennifer Lundy NP    Total floor/unit time spent 36 min with >50% time spent on counseling and coordination of care. Counseling regarding cad management. Coordinated care with nursing for management of diabetes.

## 2021-06-08 NOTE — PROGRESS NOTES
[facility]    DATE: 2020    NAME:  Marcie Duarte             :  1935  MRN: [id number]  CODE STATUS:  DNR/DNI    VISIT TYPE: Discharge Summary     FACILITY:  [facility] [[id number]]       CHIEF COMPLAIN/REASON FOR VISIT:    Chief Complaint   Patient presents with     Discharge Summary               HISTORY OF PRESENT ILLNESS: Marcie Duarte is a 84 y.o. female who was admitted to [facility] - for worsening confusion, altered mental status. Cognitive decline had been noted progressively over last few months. She was confused and mismanaging her medications as well as diabetes. Family was unsure if she was wearing her cpap. Family had been working on moving into higher level of care. The night prior to admission she had been hallucinating that there were people in her apartment moving around furniture and painting her walls. She was progressively weaker and was brought to [place] [facility]. She did have MARIANO at [place] [facility] and family decided to take her home instead of transferring to a tertiary  Setting. After returning home she wandered outside and fell. She was then transferred to [facility] ED and admitted. She was treated for delirium with hallucinations which improved during hospital course. MRI brain was negative and infection workup  Negative. Psych was consulted and did recommend empiric thiamine and prn medications. She was treated for MARIANO on CKD and taken off losartan. Her plavix was changed to brilinta. HGa1c was 6.5 and diabetes regimen was adjusted. She was recommended TCU transfer and transferred to [place]. She has PMH of CVA, CAD s/p  PCI 2020, HTN, type 2 DM, CKD stage 3, fibromyalgia, CARMEN. Prior to this she lived in senior independent living. She was sent to [facility] ED on  6/3 for chest pain and transferred to [facility] for admission 6/3-. Her SBP was in 200s and chest pain did improve some with nitro. troponins were  negative and EKG was stable. She did undergo Lexiscan stress test which was negative. She was discharged back to TCU after no recurrence of chest pain.     TCU course:   Ms. Duarte has made progress with therapy and has returned to baseline functioning. Therapy recommended returning home with home care services but at the care conference family and patient requested assisted living placement. She is scheduled to be discharging on 6/13 so it is unclear if she will be placed by then or will need to return home until placement can be found. During her tcu course she had resolution of her hallucinations. Her confusion improved and she is not alert and oriented. She did have an episode of chest pain and required hospital transfer with observation stay but workup was negative. She was hypertensive and her metoprolol was increased. She had hyperglycemia due to recent reduction in insulin. This was increased to 40u prior to discharge. Her hga1c was 6.5 recently, however her lantus was reduced from 70u to 20u recently. Her labs were stable. She did not need zyprexa during her tcu course for behaviors so this was discontinued. She was treated for candidal intertrigo. She was started on miralax for constipation. She will be discharging on 6/13 with  PT, OT, HHA, RN, SW and either to home or to assisted living if this can be arranged prior to tomorrow. She will f/u with PCP in 5-7  Days.       REVIEW OF SYSTEMS:  PROBLEMS AND REVIEW OF SYSTEMS:   Today on ROS:   Currently, no fever, chills, or rigors. Decreased vision and hearing. Denies any chest pain, headaches, palpitations, lightheadedness. Appetite is good. Denies any difficulty with swallowing, nausea, or vomiting.  Denies any abdominal pain, diarrhea or constipation. Denies any urinary symptoms. No insomnia. Positive for forgetful at times, shortness of breath with exertion at baseline, dry cough, no recent chest pain      Allergies   Allergen Reactions     Duloxetine  Cough     Exenatide Nausea Only     Lisinopril Cough     Pregabalin      Other reaction(s): Gastrointestinal     Sulfa (Sulfonamide Antibiotics)      Tetracyclines Hives     Current Outpatient Medications   Medication Sig     acetaminophen (TYLENOL) 500 MG tablet Take 1,000 mg by mouth 2 (two) times a day. And 1000 mg daily prn     albuterol (PROAIR HFA;PROVENTIL HFA;VENTOLIN HFA) 90 mcg/actuation inhaler Inhale 2 puffs every 4 (four) hours as needed for wheezing.     amLODIPine (NORVASC) 10 MG tablet Take 10 mg by mouth daily. HOLD for SBP <110     aspirin 81 mg chewable tablet Chew 81 mg daily.     calcium carbonate (OS-MICHAEL) 500 mg calcium (1,250 mg) chewable tablet Chew 1 tablet 2 (two) times a day.     citalopram (CELEXA) 20 MG tablet Take 20 mg by mouth daily.     ezetimibe (ZETIA) 10 mg tablet Take 10 mg by mouth at bedtime.     insulin glargine (LANTUS) 100 unit/mL vial Inject 40 Units under the skin daily. At noon      melatonin 3 mg Tab tablet Take 3 mg by mouth at bedtime.     metoprolol succinate (TOPROL-XL) 50 MG 24 hr tablet Take 100 mg by mouth at bedtime. Hold for SBP<100 or HR<60     nitroglycerin (NITROSTAT) 0.4 MG SL tablet Place 0.4 mg under the tongue every 5 (five) minutes as needed for chest pain.     nystatin (MYCOSTATIN) powder Apply 1 application topically 2 (two) times a day. Apply to bilateral breast and lower abdomen     polyethylene glycol (MIRALAX) 17 gram packet Take 17 g by mouth daily. Hold if loose stools     ticagrelor (BRILINTA) 90 mg Tab Take 90 mg by mouth 2 (two) times a day.     Past Medical History:    Past Medical History:   Diagnosis Date     Abdominal hernia      Acute kidney failure (H)      AMS (altered mental status)      CAD (coronary artery disease)      Cardiomegaly      CKD (chronic kidney disease) stage 3, GFR 30-59 ml/min (H)      Colon polyps      Dementia (H)      Depression      DM2 (diabetes mellitus, type 2) (H)      Fibromyalgia      GERD (gastroesophageal  reflux disease)      HLD (hyperlipidemia)      HTN (hypertension)      Macular degeneration      Morbid obesity (H)      CARMEN (obstructive sleep apnea)      Repeated falls      Sleep disturbance      TIA (transient ischemic attack)            PHYSICAL EXAMINATION  Vitals:    06/11/20 0700   BP: 174/74   Pulse: 64   Resp: 18   Temp: 98.2  F (36.8  C)   SpO2: 96%   Weight: 222 lb (100.7 kg)       Today on physical exam:     GENERAL: Awake, Alert, oriented x3, not in any form of acute distress, answers questions appropriately, follows simple commands, conversant, obese  HEENT: Head is normocephalic with normal hair distribution. No evidence of trauma. Ears: No acute purulent discharge. Eyes: Conjunctivae pink with no scleral jaundice. Nose: Normal mucosa and septum. NECK: Supple with no cervical or supraclavicular lymphadenopathy. Trachea is midline. Decreased vision and hearing  CHEST: No tenderness or deformity, no crepitus  LUNG: dim to auscultation with good chest expansion. There are no crackles or wheezes, normal AP diameter. No shortness of breath or cough  BACK: No kyphosis of the thoracic spine. Symmetric, no curvature, ROM normal, no CVA tenderness, no spinal tenderness   CVS: There is good S1  S2, regular rhythm, there are no murmurs, rubs, gallops, or heaves,  2+ pulses symmetric in all extremities.  ABDOMEN: obese and soft, nontender to palpation, non distended, no masses, no organomegaly, good bowel sounds, no rebound or guarding, no peritoneal signs.   EXTREMITIES: No pedal edema, Atraumatic. Full range of motion on both upper and lower extremities, there is no tenderness to palpation, no cyanosis or clubbing, no calf tenderness.  Pulses equal in all extremities, normal cap refill, no joint swelling.  SKIN: Warm and dry, no erythema noted.  Skin color, texture, no rashes or lesions.  NEUROLOGICAL: The patient is oriented to person, place and time, forgetful at times            LABS:   No results found for  this or any previous visit (from the past 168 hour(s)).  Results for orders placed or performed in visit on 06/03/20   Basic Metabolic Panel   Result Value Ref Range    Sodium 140 136 - 145 mmol/L    Potassium 4.1 3.5 - 5.0 mmol/L    Chloride 107 98 - 107 mmol/L    CO2 25 22 - 31 mmol/L    Anion Gap, Calculation 8 5 - 18 mmol/L    Glucose 159 (H) 70 - 125 mg/dL    Calcium 9.5 8.5 - 10.5 mg/dL    BUN 31 (H) 8 - 28 mg/dL    Creatinine 1.53 (H) 0.60 - 1.10 mg/dL    GFR MDRD Af Amer 39 (L) >60 mL/min/1.73m2    GFR MDRD Non Af Amer 32 (L) >60 mL/min/1.73m2         Lab Results   Component Value Date    HGB 11.9 (L) 06/03/2020       No results found for: TSORORBT77  No results found for: HGBA1C  No results found for: INR, PROTIME  No results found for: [id number]  No results found for: TSH        ASSESSMENT/PLAN:    Chest pain, CAD: no recent chest pain. Workup was negative. Did have recent PCI in February. on aspirin, zetia, brilinta, nitrostat prn. F/u with cardiology.   Weakness, Fall: PT and OT following. No  Recent falls. Improving with therapy, recommended returning home but working on [facility] placement as well.   HTN: SBP 170s. on amlodipine, metoprolol. Increased metoprolol dose today.   Type 2 DM: Accuchecks 194-345. on sliding scale, but will dc prior to discharge. On lantus 27u day, reports being on 70u at home will increase her to 40u daily. Reports checking her blood sugar maybe twice a day at home, gave self a set amount of insulin, mixing up meds at home. hga1c 6.5 recently. Could use med management assistance. Working on longterm placement.   CKD stage 3: cr 1.53 and gfr 36 on 6/4. Stable.   Obesity: counseling provided on improving healthier eating habits and increasing physical activity.   OA, fibromyalgia, headaches: reports intermittent pain, on tylenol.   H/o CVA: issues with word finding at times. No other deficits noted.   Depression, anxiety: on citalopram. No behavior concerns or anxiety, dc  "zyprexa.  Insomnia: on melatonin at bedtime.   GERD: mylanta prn.   CARMEN: encourage compliance with CPAP.   Mild cognitive impairment: appears delirium and hallucinations resolved, no recent issues. Alert and oriented today but can be forgetful at times. Dc zyprexa as not needing. Overall stable.   Candidal intertrigo: nystatin    Electronically signed by: Jennifer Lundy NP    Total floor/unit time spent 40 min with >50% time spent on counseling and coordination of care. Counseling regarding cad management, diabetes management. Coordinated care with nursing, therapy,  for discharge planning, equipment needs, home health services, primary care follow up.     Please evaluate Marcie Duarte for admission to Home Health.    Face to Face Attestation and Initial Plan of Care    The face-to-face encounter occurred on date: 6/11/20  Face to Face encounter was with: Jennifer Lundy    Please provide brief clinical summary of reason for visit and need for home care. Deconditioning after hospital course for falls, weakness    Please identify which of the following home health disciplines the patient will need AND describe the skilled services that you would like the home health agency to perform: SKILLED NURSING (RN): complex med management, teach wound care and Other diabetes monitoring and management, PHYSICAL THERAPY: strength training and gait training, OCCUPATIONAL THERAPY: ADLs and home safety, MEDICAL SOCIAL WORK and HOME HEALTH AIDE    Homebound Status (describe the functional limitations that support this patient is confined to his/her home. Medicaid recipients are not required to be homebound.):assistive device needed:  4WW    Name of physician who will be responsible for the ongoing home health plan of care (CMS requires the referring physician to provide the specific name of the community physician instead of a title, such as \"PCP\"): Odilon Deleon MD    Requested Start of Care Date: Within 48 " hours    Other information to assist the home health agency in developing the initial Plan of Care:    I certify that services are/were furnished while this patient was under the care of a physician and that a physician or an allowed non-physician practitioner (NPP), had a face-to-face encounter that meets the physician face-to-face encounter requirements. The encounter was in whole, or in part, related to the primary reason for home health. The patient is confined to his/her home and needs intermittent skilled nursing, physical therapy, speech-language pathology, or the continued need for occupational therapy. A plan of care has been established by a physician and is periodically reviewed by a physician.    Post Discharge Medication Reconciliation Status: discharge medications reconciled, continue medications without change

## 2021-06-16 PROBLEM — K21.9 ESOPHAGEAL REFLUX: Status: ACTIVE | Noted: 2020-06-01

## 2021-06-16 PROBLEM — R53.1 WEAKNESS: Status: ACTIVE | Noted: 2020-06-01

## 2021-06-16 PROBLEM — G47.33 SEVERE OBSTRUCTIVE SLEEP APNEA: Status: ACTIVE | Noted: 2020-06-01

## 2021-06-16 PROBLEM — M79.7 FIBROMYALGIA: Status: ACTIVE | Noted: 2020-06-01

## 2021-06-16 PROBLEM — N18.30 CKD (CHRONIC KIDNEY DISEASE) STAGE 3, GFR 30-59 ML/MIN (H): Status: ACTIVE | Noted: 2019-10-17

## 2021-06-16 PROBLEM — F32.A DEPRESSION: Status: ACTIVE | Noted: 2020-06-01

## 2021-06-16 PROBLEM — R41.0 DELIRIUM: Status: ACTIVE | Noted: 2020-06-01

## 2021-06-16 PROBLEM — I25.10 CAD (CORONARY ARTERY DISEASE): Status: ACTIVE | Noted: 2020-06-01

## 2021-06-16 PROBLEM — R29.6 FREQUENT FALLS: Status: ACTIVE | Noted: 2020-06-01

## 2021-06-20 NOTE — LETTER
Letter by Jennifer Lundy NP at      Author: Jennifer Lundy NP Service: -- Author Type: --    Filed:  Encounter Date: 2020 Status: (Other)         Patient: Marcie Duarte   MR Number: [id number]   YOB: 1935   Date of Visit: 2020                 [facility]    DATE: 2020    NAME:  Marcie Duarte             :  1935  MRN: [id number]  CODE STATUS:  DNR/DNI    VISIT TYPE: H & P (AMS, fall, hypertension)     FACILITY:  [facility] [[id number]]       CHIEF COMPLAIN/REASON FOR VISIT:    Chief Complaint   Patient presents with   ? H & P     AMS, fall, hypertension               HISTORY OF PRESENT ILLNESS: Marcie Duarte is a 84 y.o. female who was admitted to [facility] - for worsening confusion, altered mental status. Cognitive decline had been noted progressively over last few months. She was confused and mismanaging her medications as well as diabetes. Family was unsure if she was wearing her cpap. Family had been working on moving into higher level of care. The night prior to admission she had been hallucinating that there were people in her apartment moving around furniture and painting her walls. She was progressively weaker and was brought to [place] [facility]. She did have MARIANO at [place] [facility] and family decided to take her home instead of transferring to a tertiary  Setting. After returning home she wandered outside and fell. She was then transferred to [facility] ED and admitted. She was treated for delirium with hallucinations which improved during hospital course. MRI brain was negative and infection workup  Negative. Psych was consulted and did recommend empiric thiamine and prn medications. She was treated for MARIANO on CKD and taken off losartan. Her plavix was changed to brilinta. HGa1c was 6.5 and diabetes regimen was adjusted. She was recommended TCU transfer and transferred to [place]. She has PMH of CVA, CAD s/p  PCI 2020, HTN,  type 2 DM, CKD stage 3, fibromyalgia, CARMEN. Prior to this she lived in senior independent living.     Today Ms. Duarte is seen for hospital follow up and admission to TCU visit after AMS, fall, and now having hypertension. Per nursing her blood pressures have been elevated the last few days in  160-190s. She has been complaining of headaches and was given a standing house order for tylenol last evening. On exam she is seen in  Her room alone. She says she had a shower with OT this morning and that went well. She is having some stomach pain right now but otherwise just her usual  Aches and pains. She has rheumatoid arthritis and fibromyalgia so she is always aching. She says she takes tylenol usually twice a day at home. She is having regular bowel movements and needs to have one right now. She is not having any  Shortness of breath other than normal for her with  Exertion. She denies urinary trouble. She has a little cough but says the CPAP she wore in the hospital dried her out and made her cough. She is having intermittent headaches but not sure if it is related to her blood pressure. She does get these at times at home as well. The tylenol usually  Takes care of it for her. She says she is not having any memory issues now and that she feels more clear than she did before. She reports having a CVA a few years ago and has had some trouble with words since then but not anything more than usual now. She denies having any injury from her fall. She says she tries to check her blood sugar once or so a day at home. We discussed code status and she is a DNR. She is a former smoker and denies drinking alcohol more than on occasion. We reviewed her meds and she denies any concerns with them today. She is sleeping ok.     REVIEW OF SYSTEMS:  PROBLEMS AND REVIEW OF SYSTEMS:   Today on ROS:   Currently, no fever, chills, or rigors. Decreased vision and hearing. Denies any chest pain, headaches, palpitations, lightheadedness.  Appetite is good. Denies any difficulty with swallowing, nausea, or vomiting.  Denies any abdominal pain, diarrhea or constipation. Denies any urinary symptoms. No insomnia. Positive for weakness, recent fall, forgetful at times but improving, reports confusion improving, shortness of breath with exertion at baseline, dry cough, stomach pain on occasion, elevated blood pressures, reflux intermittently      Allergies   Allergen Reactions   ? Duloxetine Cough   ? Exenatide Nausea Only   ? Lisinopril Cough   ? Pregabalin      Other reaction(s): Gastrointestinal   ? Sulfa (Sulfonamide Antibiotics)    ? Tetracyclines Hives     Current Outpatient Medications   Medication Sig   ? acetaminophen (TYLENOL) 500 MG tablet Take 500 mg by mouth 2 (two) times a day. And daily p rn   ? albuterol (PROAIR HFA;PROVENTIL HFA;VENTOLIN HFA) 90 mcg/actuation inhaler Inhale 2 puffs every 4 (four) hours as needed for wheezing.   ? amLODIPine (NORVASC) 10 MG tablet Take 10 mg by mouth daily.   ? aspirin 81 mg chewable tablet Chew 81 mg daily.   ? calcium carbonate (OS-MICHAEL) 500 mg calcium (1,250 mg) chewable tablet Chew 1 tablet 2 (two) times a day.   ? citalopram (CELEXA) 20 MG tablet Take 20 mg by mouth daily.   ? ezetimibe (ZETIA) 10 mg tablet Take 10 mg by mouth at bedtime.   ? insulin aspart U-100 (NOVOLOG) 100 unit/mL injection Inject under the skin. 8 units at breakfast, 4 units at lunch, and 7 units at dinner   ? insulin glargine (LANTUS) 100 unit/mL vial Inject 25 Units under the skin daily. At noon   ? melatonin 3 mg Tab tablet Take 3 mg by mouth at bedtime.   ? metoprolol succinate (TOPROL-XL) 50 MG 24 hr tablet Take 100 mg by mouth at bedtime.    ? nitroglycerin (NITROSTAT) 0.4 MG SL tablet Place 0.4 mg under the tongue every 5 (five) minutes as needed for chest pain.   ? nystatin (MYCOSTATIN) powder Apply 1 application topically 2 (two) times a day. Apply to bilateral breast and lower abdomen   ? OLANZapine (ZYPREXA) 2.5 MG  tablet Take 2.5 mg by mouth every 6 (six) hours as needed.   ? ticagrelor (BRILINTA) 90 mg Tab Take 90 mg by mouth 2 (two) times a day.     Past Medical History:    Past Medical History:   Diagnosis Date   ? Abdominal hernia    ? Acute kidney failure (H)    ? AMS (altered mental status)    ? CAD (coronary artery disease)    ? Cardiomegaly    ? CKD (chronic kidney disease) stage 3, GFR 30-59 ml/min (H)    ? Colon polyps    ? Dementia (H)    ? Depression    ? DM2 (diabetes mellitus, type 2) (H)    ? Fibromyalgia    ? GERD (gastroesophageal reflux disease)    ? HLD (hyperlipidemia)    ? HTN (hypertension)    ? Macular degeneration    ? Morbid obesity (H)    ? CARMEN (obstructive sleep apnea)    ? Repeated falls    ? Sleep disturbance    ? TIA (transient ischemic attack)            PHYSICAL EXAMINATION  Vitals:    06/01/20 0700   BP: (!) 195/63   Pulse: 71   Resp: 18   Temp: 98.2  F (36.8  C)   SpO2: 96%       Today on physical exam:     GENERAL: Awake, Alert, oriented x3, not in any form of acute distress, answers questions appropriately, follows simple commands, conversant, obese  HEENT: Head is normocephalic with normal hair distribution. No evidence of trauma. Ears: No acute purulent discharge. Eyes: Conjunctivae pink with no scleral jaundice. Nose: Normal mucosa and septum. NECK: Supple with no cervical or supraclavicular lymphadenopathy. Trachea is midline. Decreased vision and hearing  CHEST: No tenderness or deformity, no crepitus  LUNG: dim to auscultation with good chest expansion. There are no crackles or wheezes, normal AP diameter.  BACK: No kyphosis of the thoracic spine. Symmetric, no curvature, ROM normal, no CVA tenderness, no spinal tenderness   CVS: There is good S1  S2, regular rhythm, there are no murmurs, rubs, gallops, or heaves,  2+ pulses symmetric in all extremities.  ABDOMEN: obese and soft, nontender to palpation, non distended, no masses, no organomegaly, good bowel sounds, no rebound or  guarding, no peritoneal signs.   EXTREMITIES: No pedal edema, Atraumatic. Full range of motion on both upper and lower extremities, there is no tenderness to palpation, no cyanosis or clubbing, no calf tenderness.  Pulses equal in all extremities, normal cap refill, no joint swelling.  SKIN: Warm and dry, no erythema noted.  Skin color, texture, no rashes or lesions.  NEUROLOGICAL: The patient is oriented to person, place and time, forgetful at times, confusion  improving            LABS:   No results found for this or any previous visit (from the past 168 hour(s)).  No results found for this or any previous visit.      No results found for: WBC, HGB, HCT, MCV, PLT    No results found for: [id number]  No results found for: HGBA1C  No results found for: INR, PROTIME  No results found for: [id number]  No results found for: TSH        ASSESSMENT/PLAN:    Altered mental status, delirium with hallucinations: appears resolved. A/o today, no hallucinations noted. Forgetful at times. Will have therapy  Perform cognitive testing as may have some underlying cognitive impairment. zyprexa prn. No behavior concerns today.   Weakness, Fall: PT and OT following. Fall prevention protocol discussed with  Nursing.   HTN: -190s. Losartan was discontinued in hospital, may need to consider restarting. For now will continue amlodipine, increase metoprolol to 100mg. Added hold parameters as well. Discussed with her and nursing the changes made today and when to notify provider.  Type 2 DM: Accuchecks 125-290. DC sliding scale, keep meal dosing insulin for now. On lantus 25u daily.  Will discuss further this with her to see if this is new or not and if this is reasonable management for home. hga1c 6.5.   CAD: recent PCI in February. Was on plavix now changed to brilinta. On aspirin, zetia, nitrostat prn.   CKD stage 3: bmp ordered for 6/3.   Obesity: counseling provided  OA, fibromyalgia, headaches: reports intermittent pain,  takes tylenol twice daily  At home. Discussed pain management with her. Will change order to two times a day and daily p rn pain. Dc MERISSA tylenol, max of 3gm/24 hours from all sources/orders.   H/o CVA: issues with word finding at times. No other deficits noted.   Depression, anxiety: on citalopram. No concerns today. zyprexa prn.   Insomnia: on melatonin at bedtime.   GERD: mylanta prn.   CARMEN: encourage compliance with CPAP.     Electronically signed by: Jennifer Lundy NP    Total floor/unit time spent 45 min with >50% time spent on counseling and coordination of care. Counseling was done regarding hypertension management, pain management. Coordinated care with  Nursing for management of hypertension, fall prevention protocol.

## 2021-06-20 NOTE — LETTER
Letter by Jennifer Lundy NP at      Author: Jennifer Lundy NP Service: -- Author Type: --    Filed:  Encounter Date: 2020 Status: (Other)         Patient: Marcie Duarte   MR Number: [id number]   YOB: 1935   Date of Visit: 2020                 [facility]    DATE: 2020    NAME:  Marcie Duarte             :  1935  MRN: [id number]  CODE STATUS:  DNR/DNI    VISIT TYPE: H & P (readmission after chest pain, CAD)     FACILITY:  [facility] [[id number]]       CHIEF COMPLAIN/REASON FOR VISIT:    Chief Complaint   Patient presents with   ? H & P     readmission after chest pain, CAD               HISTORY OF PRESENT ILLNESS: Marcie Duarte is a 84 y.o. female who was admitted to [facility] - for worsening confusion, altered mental status. Cognitive decline had been noted progressively over last few months. She was confused and mismanaging her medications as well as diabetes. Family was unsure if she was wearing her cpap. Family had been working on moving into higher level of care. The night prior to admission she had been hallucinating that there were people in her apartment moving around furniture and painting her walls. She was progressively weaker and was brought to [facility]. She did have MARIANO at [facility] and family decided to take her home instead of transferring to a tertiary  Setting. After returning home she wandered outside and fell. She was then transferred to [facility] ED and admitted. She was treated for delirium with hallucinations which improved during hospital course. MRI brain was negative and infection workup  Negative. Psych was consulted and did recommend empiric thiamine and prn medications. She was treated for MARIANO on CKD and taken off losartan. Her plavix was changed to brilinta. HGa1c was 6.5 and diabetes regimen was adjusted. She was recommended TCU transfer and transferred to [place]. She has PMH of CVA, CAD s/p   PCI feb 2020, HTN, type 2 DM, CKD stage 3, fibromyalgia, CARMEN. Prior to this she lived in senior independent living. She was sent to [facility] ED on  6/3 for chest pain and transferred to [facility] for admission 6/3-6/5. Her SBP was in 200s and chest pain did improve some with nitro. troponins were negative and EKG was stable. She did undergo Lexiscan stress test which was negative. She was discharged back to TCU after no recurrence of chest pain.     Today Ms. Duarte is seen today in her room alone. She says she is doing fine today. She has not had any more chest pain. She is sleeping fine and thinks her mind is clearing up. She does not feel she is hallucinating or having any more personality changes. She is convinced it was that medication like her kids thought. She says her bowels seem to be fine and her appetite is good. She is not having any dizziness or stomach upset problems. We reviewed her med list and no concerns. She says she used to check her blood sugar a couple times a day at home and would give herself a set amount of insulin. She does admit she was not good at being regular about this. She also admits she mixed up her long acting insulin a few times and was not giving herself the correct doses. She denies other issues. She does have chronic aches and pains but nothing more than usual for her.     REVIEW OF SYSTEMS:  PROBLEMS AND REVIEW OF SYSTEMS:   Today on ROS:   Currently, no fever, chills, or rigors. Decreased vision and hearing. Denies any chest pain, headaches, palpitations, lightheadedness. Appetite is good. Denies any difficulty with swallowing, nausea, or vomiting.  Denies any abdominal pain, diarrhea or constipation. Denies any urinary symptoms. No insomnia. Positive for forgetful at times, shortness of breath with exertion at baseline, dry cough, no recent chest pain      Allergies   Allergen Reactions   ? Duloxetine Cough   ? Exenatide Nausea Only   ? Lisinopril Cough   ?  Pregabalin      Other reaction(s): Gastrointestinal   ? Sulfa (Sulfonamide Antibiotics)    ? Tetracyclines Hives     Current Outpatient Medications   Medication Sig   ? acetaminophen (TYLENOL) 500 MG tablet Take 500 mg by mouth 2 (two) times a day. And daily p rn   ? albuterol (PROAIR HFA;PROVENTIL HFA;VENTOLIN HFA) 90 mcg/actuation inhaler Inhale 2 puffs every 4 (four) hours as needed for wheezing.   ? amLODIPine (NORVASC) 10 MG tablet Take 10 mg by mouth daily.   ? aspirin 81 mg chewable tablet Chew 81 mg daily.   ? calcium carbonate (OS-MICHAEL) 500 mg calcium (1,250 mg) chewable tablet Chew 1 tablet 2 (two) times a day.   ? citalopram (CELEXA) 20 MG tablet Take 20 mg by mouth daily.   ? ezetimibe (ZETIA) 10 mg tablet Take 10 mg by mouth at bedtime.   ? insulin aspart U-100 (NOVOLOG) 100 unit/mL injection Inject under the skin. 8 units at breakfast, 4 units at lunch, and 7 units at dinner   ? insulin glargine (LANTUS) 100 unit/mL vial Inject 27 Units under the skin daily. At noon    ? melatonin 3 mg Tab tablet Take 3 mg by mouth at bedtime.   ? metoprolol succinate (TOPROL-XL) 50 MG 24 hr tablet Take 100 mg by mouth at bedtime.    ? nitroglycerin (NITROSTAT) 0.4 MG SL tablet Place 0.4 mg under the tongue every 5 (five) minutes as needed for chest pain.   ? nystatin (MYCOSTATIN) powder Apply 1 application topically 2 (two) times a day. Apply to bilateral breast and lower abdomen   ? OLANZapine (ZYPREXA) 2.5 MG tablet Take 2.5 mg by mouth every 6 (six) hours as needed.   ? ticagrelor (BRILINTA) 90 mg Tab Take 90 mg by mouth 2 (two) times a day.     Past Medical History:    Past Medical History:   Diagnosis Date   ? Abdominal hernia    ? Acute kidney failure (H)    ? AMS (altered mental status)    ? CAD (coronary artery disease)    ? Cardiomegaly    ? CKD (chronic kidney disease) stage 3, GFR 30-59 ml/min (H)    ? Colon polyps    ? Dementia (H)    ? Depression    ? DM2 (diabetes mellitus, type 2) (H)    ? Fibromyalgia     ? GERD (gastroesophageal reflux disease)    ? HLD (hyperlipidemia)    ? HTN (hypertension)    ? Macular degeneration    ? Morbid obesity (H)    ? CARMEN (obstructive sleep apnea)    ? Repeated falls    ? Sleep disturbance    ? TIA (transient ischemic attack)            PHYSICAL EXAMINATION  Vitals:    06/08/20 0700   BP: 165/60   Pulse: 70   Resp: 18   Temp: 97.5  F (36.4  C)   SpO2: 97%   Weight: 220 lb (99.8 kg)       Today on physical exam:     GENERAL: Awake, Alert, oriented x3, not in any form of acute distress, answers questions appropriately, follows simple commands, conversant, obese  HEENT: Head is normocephalic with normal hair distribution. No evidence of trauma. Ears: No acute purulent discharge. Eyes: Conjunctivae pink with no scleral jaundice. Nose: Normal mucosa and septum. NECK: Supple with no cervical or supraclavicular lymphadenopathy. Trachea is midline. Decreased vision and hearing  CHEST: No tenderness or deformity, no crepitus  LUNG: dim to auscultation with good chest expansion. There are no crackles or wheezes, normal AP diameter.  BACK: No kyphosis of the thoracic spine. Symmetric, no curvature, ROM normal, no CVA tenderness, no spinal tenderness   CVS: There is good S1  S2, regular rhythm, there are no murmurs, rubs, gallops, or heaves,  2+ pulses symmetric in all extremities.  ABDOMEN: obese and soft, nontender to palpation, non distended, no masses, no organomegaly, good bowel sounds, no rebound or guarding, no peritoneal signs.   EXTREMITIES: No pedal edema, Atraumatic. Full range of motion on both upper and lower extremities, there is no tenderness to palpation, no cyanosis or clubbing, no calf tenderness.  Pulses equal in all extremities, normal cap refill, no joint swelling.  SKIN: Warm and dry, no erythema noted.  Skin color, texture, no rashes or lesions.  NEUROLOGICAL: The patient is oriented to person, place and time, forgetful at times            LABS:   Recent Results (from the  past 168 hour(s))   Basic Metabolic Panel   Result Value Ref Range    Sodium 140 136 - 145 mmol/L    Potassium 4.1 3.5 - 5.0 mmol/L    Chloride 107 98 - 107 mmol/L    CO2 25 22 - 31 mmol/L    Anion Gap, Calculation 8 5 - 18 mmol/L    Glucose 159 (H) 70 - 125 mg/dL    Calcium 9.5 8.5 - 10.5 mg/dL    BUN 31 (H) 8 - 28 mg/dL    Creatinine 1.53 (H) 0.60 - 1.10 mg/dL    GFR MDRD Af Amer 39 (L) >60 mL/min/1.73m2    GFR MDRD Non Af Amer 32 (L) >60 mL/min/1.73m2   Hemoglobin   Result Value Ref Range    Hemoglobin 11.9 (L) 12.0 - 16.0 g/dL     Results for orders placed or performed in visit on 06/03/20   Basic Metabolic Panel   Result Value Ref Range    Sodium 140 136 - 145 mmol/L    Potassium 4.1 3.5 - 5.0 mmol/L    Chloride 107 98 - 107 mmol/L    CO2 25 22 - 31 mmol/L    Anion Gap, Calculation 8 5 - 18 mmol/L    Glucose 159 (H) 70 - 125 mg/dL    Calcium 9.5 8.5 - 10.5 mg/dL    BUN 31 (H) 8 - 28 mg/dL    Creatinine 1.53 (H) 0.60 - 1.10 mg/dL    GFR MDRD Af Amer 39 (L) >60 mL/min/1.73m2    GFR MDRD Non Af Amer 32 (L) >60 mL/min/1.73m2         Lab Results   Component Value Date    HGB 11.9 (L) 06/03/2020       No results found for: PKYWQBPF26  No results found for: HGBA1C  No results found for: INR, PROTIME  No results found for: [id number]  No results found for: TSH        ASSESSMENT/PLAN:    Chest pain, CAD: chest pain resolved with management of hypertension. No recurrence. troponins negative. Did have recent PCI in February. Now on aspirin, zetia, brilinta, nitrostat prn. F/u with cardiology.   Weakness, Fall: PT and OT following. No  Recent falls. Improving with therapy.   HTN: SBP 160s. on amlodipine, metoprolol. Hold parameters. Recently stopped losartan, may need to restart.   Type 2 DM: Accuchecks 227-303. on sliding scale, keep meal dosing insulin for now. On lantus 25u daily.  Reports checking her blood sugar maybe twice a day at home, gave self a set amount of insulin, mixing up meds at home. hga1c 6.5. denies  following diabetic diet  Will keep current regimen and dc sliding scale when more stable. Depending on discharge plans may need a safer regimen for home. Increase lantus to 27u due to hyperglycemia. Discussed with nursing when to notify for hypo or hyperglycemia.   CKD stage 3: cr 1.53 and gfr 36 on 6/4. Stable.   Obesity: counseling provided on improving healthier eating habits and increasing physical activity.   OA, fibromyalgia, headaches: reports intermittent pain, takes tylenol twice daily  At home. Changed tylenol back to two times a day and daily prn.   H/o CVA: issues with word finding at times. No other deficits noted.   Depression, anxiety: on citalopram. No concerns today. zyprexa prn.   Insomnia: on melatonin at bedtime.   GERD: mylanta prn.   CARMEN: encourage compliance with CPAP.   Mild cognitive impairment: appears delirium and hallucinations now resolved. Not seeing people anymore. Appears more alert and oriented today. Remains forgetful at times. zyprexa prn, will dc prior to discharge if not needing.   Candidal intertrigo: nursing reports rash under bilateral breasts. Discussed with nursing and will order Nystatin two times a day until resolved.     Electronically signed by: Jennifer Lundy NP    Total floor/unit time spent 36 min with >50% time spent on counseling and coordination of care. Counseling regarding cad management. Coordinated care with nursing for management of diabetes.

## 2021-06-20 NOTE — LETTER
Letter by Jennifer Lundy NP at      Author: Jennifer Lundy NP Service: -- Author Type: --    Filed:  Encounter Date: 2020 Status: (Other)         Patient: Marcie Duarte   MR Number: [id number]   YOB: 1935   Date of Visit: 2020                 [facility]    DATE: 2020    NAME:  Marcie Duarte             :  1935  MRN: [id number]  CODE STATUS:  DNR/DNI    VISIT TYPE: Discharge Summary     FACILITY:  [facility] [[id number]]       CHIEF COMPLAIN/REASON FOR VISIT:    Chief Complaint   Patient presents with   ? Discharge Summary               HISTORY OF PRESENT ILLNESS: Marcie Duarte is a 84 y.o. female who was admitted to [facility] - for worsening confusion, altered mental status. Cognitive decline had been noted progressively over last few months. She was confused and mismanaging her medications as well as diabetes. Family was unsure if she was wearing her cpap. Family had been working on moving into higher level of care. The night prior to admission she had been hallucinating that there were people in her apartment moving around furniture and painting her walls. She was progressively weaker and was brought to [place] [facility]. She did have MARIANO at [place] ED and family decided to take her home instead of transferring to a tertiary  Setting. After returning home she wandered outside and fell. She was then transferred to [facility] ED and admitted. She was treated for delirium with hallucinations which improved during hospital course. MRI brain was negative and infection workup  Negative. Psych was consulted and did recommend empiric thiamine and prn medications. She was treated for MARIANO on CKD and taken off losartan. Her plavix was changed to brilinta. HGa1c was 6.5 and diabetes regimen was adjusted. She was recommended TCU transfer and transferred to [facility]. She has PMH of CVA, CAD s/p  PCI 2020, HTN, type 2 DM, CKD stage 3,  fibromyalgia, CARMEN. Prior to this she lived in senior independent living. She was sent to [facility] ED on  6/3 for chest pain and transferred to [facility] for admission 6/3-6/5. Her SBP was in 200s and chest pain did improve some with nitro. troponins were negative and EKG was stable. She did undergo Lexiscan stress test which was negative. She was discharged back to TCU after no recurrence of chest pain.     TCU course:   Ms. Duarte has made progress with therapy and has returned to baseline functioning. Therapy recommended returning home with home care services but at the care conference family and patient requested assisted living placement. She is scheduled to be discharging on 6/13 so it is unclear if she will be placed by then or will need to return home until placement can be found. During her tcu course she had resolution of her hallucinations. Her confusion improved and she is not alert and oriented. She did have an episode of chest pain and required hospital transfer with observation stay but workup was negative. She was hypertensive and her metoprolol was increased. She had hyperglycemia due to recent reduction in insulin. This was increased to 40u prior to discharge. Her hga1c was 6.5 recently, however her lantus was reduced from 70u to 20u recently. Her labs were stable. She did not need zyprexa during her tcu course for behaviors so this was discontinued. She was treated for candidal intertrigo. She was started on miralax for constipation. She will be discharging on 6/13 with  PT, OT, HHA, RN, SW and either to home or to assisted living if this can be arranged prior to tomorrow. She will f/u with PCP in 5-7  Days.       REVIEW OF SYSTEMS:  PROBLEMS AND REVIEW OF SYSTEMS:   Today on ROS:   Currently, no fever, chills, or rigors. Decreased vision and hearing. Denies any chest pain, headaches, palpitations, lightheadedness. Appetite is good. Denies any difficulty with swallowing, nausea, or  vomiting.  Denies any abdominal pain, diarrhea or constipation. Denies any urinary symptoms. No insomnia. Positive for forgetful at times, shortness of breath with exertion at baseline, dry cough, no recent chest pain      Allergies   Allergen Reactions   ? Duloxetine Cough   ? Exenatide Nausea Only   ? Lisinopril Cough   ? Pregabalin      Other reaction(s): Gastrointestinal   ? Sulfa (Sulfonamide Antibiotics)    ? Tetracyclines Hives     Current Outpatient Medications   Medication Sig   ? acetaminophen (TYLENOL) 500 MG tablet Take 1,000 mg by mouth 2 (two) times a day. And 1000 mg daily prn   ? albuterol (PROAIR HFA;PROVENTIL HFA;VENTOLIN HFA) 90 mcg/actuation inhaler Inhale 2 puffs every 4 (four) hours as needed for wheezing.   ? amLODIPine (NORVASC) 10 MG tablet Take 10 mg by mouth daily. HOLD for SBP <110   ? aspirin 81 mg chewable tablet Chew 81 mg daily.   ? calcium carbonate (OS-MICHAEL) 500 mg calcium (1,250 mg) chewable tablet Chew 1 tablet 2 (two) times a day.   ? citalopram (CELEXA) 20 MG tablet Take 20 mg by mouth daily.   ? ezetimibe (ZETIA) 10 mg tablet Take 10 mg by mouth at bedtime.   ? insulin glargine (LANTUS) 100 unit/mL vial Inject 40 Units under the skin daily. At noon    ? melatonin 3 mg Tab tablet Take 3 mg by mouth at bedtime.   ? metoprolol succinate (TOPROL-XL) 50 MG 24 hr tablet Take 100 mg by mouth at bedtime. Hold for SBP<100 or HR<60   ? nitroglycerin (NITROSTAT) 0.4 MG SL tablet Place 0.4 mg under the tongue every 5 (five) minutes as needed for chest pain.   ? nystatin (MYCOSTATIN) powder Apply 1 application topically 2 (two) times a day. Apply to bilateral breast and lower abdomen   ? polyethylene glycol (MIRALAX) 17 gram packet Take 17 g by mouth daily. Hold if loose stools   ? ticagrelor (BRILINTA) 90 mg Tab Take 90 mg by mouth 2 (two) times a day.     Past Medical History:    Past Medical History:   Diagnosis Date   ? Abdominal hernia    ? Acute kidney failure (H)    ? AMS (altered  mental status)    ? CAD (coronary artery disease)    ? Cardiomegaly    ? CKD (chronic kidney disease) stage 3, GFR 30-59 ml/min (H)    ? Colon polyps    ? Dementia (H)    ? Depression    ? DM2 (diabetes mellitus, type 2) (H)    ? Fibromyalgia    ? GERD (gastroesophageal reflux disease)    ? HLD (hyperlipidemia)    ? HTN (hypertension)    ? Macular degeneration    ? Morbid obesity (H)    ? CARMEN (obstructive sleep apnea)    ? Repeated falls    ? Sleep disturbance    ? TIA (transient ischemic attack)            PHYSICAL EXAMINATION  Vitals:    06/11/20 0700   BP: 174/74   Pulse: 64   Resp: 18   Temp: 98.2  F (36.8  C)   SpO2: 96%   Weight: 222 lb (100.7 kg)       Today on physical exam:     GENERAL: Awake, Alert, oriented x3, not in any form of acute distress, answers questions appropriately, follows simple commands, conversant, obese  HEENT: Head is normocephalic with normal hair distribution. No evidence of trauma. Ears: No acute purulent discharge. Eyes: Conjunctivae pink with no scleral jaundice. Nose: Normal mucosa and septum. NECK: Supple with no cervical or supraclavicular lymphadenopathy. Trachea is midline. Decreased vision and hearing  CHEST: No tenderness or deformity, no crepitus  LUNG: dim to auscultation with good chest expansion. There are no crackles or wheezes, normal AP diameter. No shortness of breath or cough  BACK: No kyphosis of the thoracic spine. Symmetric, no curvature, ROM normal, no CVA tenderness, no spinal tenderness   CVS: There is good S1  S2, regular rhythm, there are no murmurs, rubs, gallops, or heaves,  2+ pulses symmetric in all extremities.  ABDOMEN: obese and soft, nontender to palpation, non distended, no masses, no organomegaly, good bowel sounds, no rebound or guarding, no peritoneal signs.   EXTREMITIES: No pedal edema, Atraumatic. Full range of motion on both upper and lower extremities, there is no tenderness to palpation, no cyanosis or clubbing, no calf tenderness.  Pulses  equal in all extremities, normal cap refill, no joint swelling.  SKIN: Warm and dry, no erythema noted.  Skin color, texture, no rashes or lesions.  NEUROLOGICAL: The patient is oriented to person, place and time, forgetful at times            LABS:   No results found for this or any previous visit (from the past 168 hour(s)).  Results for orders placed or performed in visit on 06/03/20   Basic Metabolic Panel   Result Value Ref Range    Sodium 140 136 - 145 mmol/L    Potassium 4.1 3.5 - 5.0 mmol/L    Chloride 107 98 - 107 mmol/L    CO2 25 22 - 31 mmol/L    Anion Gap, Calculation 8 5 - 18 mmol/L    Glucose 159 (H) 70 - 125 mg/dL    Calcium 9.5 8.5 - 10.5 mg/dL    BUN 31 (H) 8 - 28 mg/dL    Creatinine 1.53 (H) 0.60 - 1.10 mg/dL    GFR MDRD Af Amer 39 (L) >60 mL/min/1.73m2    GFR MDRD Non Af Amer 32 (L) >60 mL/min/1.73m2         Lab Results   Component Value Date    HGB 11.9 (L) 06/03/2020       No results found for: [id number]  No results found for: HGBA1C  No results found for: INR, PROTIME  No results found for: [id number]  No results found for: TSH        ASSESSMENT/PLAN:    Chest pain, CAD: no recent chest pain. Workup was negative. Did have recent PCI in February. on aspirin, zetia, brilinta, nitrostat prn. F/u with cardiology.   Weakness, Fall: PT and OT following. No  Recent falls. Improving with therapy, recommended returning home but working on [facility] placement as well.   HTN: SBP 170s. on amlodipine, metoprolol. Increased metoprolol dose today.   Type 2 DM: Accuchecks 194-345. on sliding scale, but will dc prior to discharge. On lantus 27u day, reports being on 70u at home will increase her to 40u daily. Reports checking her blood sugar maybe twice a day at home, gave self a set amount of insulin, mixing up meds at home. hga1c 6.5 recently. Could use med management assistance. Working on [facility] placement.   CKD stage 3: cr 1.53 and gfr 36 on 6/4. Stable.   Obesity: counseling provided on improving healthier  eating habits and increasing physical activity.   OA, fibromyalgia, headaches: reports intermittent pain, on tylenol.   H/o CVA: issues with word finding at times. No other deficits noted.   Depression, anxiety: on citalopram. No behavior concerns or anxiety, dc zyprexa.  Insomnia: on melatonin at bedtime.   GERD: mylanta prn.   CARMEN: encourage compliance with CPAP.   Mild cognitive impairment: appears delirium and hallucinations resolved, no recent issues. Alert and oriented today but can be forgetful at times. Dc zyprexa as not needing. Overall stable.   Candidal intertrigo: nystatin    Electronically signed by: Jennifer Lundy NP    Total floor/unit time spent 40 min with >50% time spent on counseling and coordination of care. Counseling regarding cad management, diabetes management. Coordinated care with nursing, therapy,  for discharge planning, equipment needs, home health services, primary care follow up.     Please evaluate Marcie Duarte for admission to Home Health.    Face to Face Attestation and Initial Plan of Care    The face-to-face encounter occurred on date: 6/11/20  Face to Face encounter was with: Jennifer Lundy    Please provide brief clinical summary of reason for visit and need for home care. Deconditioning after hospital course for falls, weakness    Please identify which of the following home health disciplines the patient will need AND describe the skilled services that you would like the home health agency to perform: SKILLED NURSING (RN): complex med management, teach wound care and Other diabetes monitoring and management, PHYSICAL THERAPY: strength training and gait training, OCCUPATIONAL THERAPY: ADLs and home safety, MEDICAL SOCIAL WORK and HOME HEALTH AIDE    Homebound Status (describe the functional limitations that support this patient is confined to his/her home. Medicaid recipients are not required to be homebound.):assistive device needed:  4WW    Name of physician  "who will be responsible for the ongoing home health plan of care (CMS requires the referring physician to provide the specific name of the community physician instead of a title, such as \"PCP\"): Odilon Deleon MD    Requested Start of Care Date: Within 48 hours    Other information to assist the home health agency in developing the initial Plan of Care:    I certify that services are/were furnished while this patient was under the care of a physician and that a physician or an allowed non-physician practitioner (NPP), had a face-to-face encounter that meets the physician face-to-face encounter requirements. The encounter was in whole, or in part, related to the primary reason for home health. The patient is confined to his/her home and needs intermittent skilled nursing, physical therapy, speech-language pathology, or the continued need for occupational therapy. A plan of care has been established by a physician and is periodically reviewed by a physician.    Post Discharge Medication Reconciliation Status: discharge medications reconciled, continue medications without change                            "

## 2021-10-09 ENCOUNTER — LAB REQUISITION (OUTPATIENT)
Dept: LAB | Facility: CLINIC | Age: 86
End: 2021-10-09
Payer: COMMERCIAL

## 2021-10-09 DIAGNOSIS — E11.65 TYPE 2 DIABETES MELLITUS WITH HYPERGLYCEMIA (H): ICD-10-CM

## 2021-10-09 DIAGNOSIS — I10 ESSENTIAL (PRIMARY) HYPERTENSION: ICD-10-CM

## 2021-10-11 LAB
ANION GAP SERPL CALCULATED.3IONS-SCNC: 11 MMOL/L (ref 5–18)
BASOPHILS # BLD AUTO: 0 10E3/UL (ref 0–0.2)
BASOPHILS NFR BLD AUTO: 0 %
BUN SERPL-MCNC: 65 MG/DL (ref 8–28)
CALCIUM SERPL-MCNC: 11.3 MG/DL (ref 8.5–10.5)
CHLORIDE BLD-SCNC: 106 MMOL/L (ref 98–107)
CO2 SERPL-SCNC: 23 MMOL/L (ref 22–31)
CREAT SERPL-MCNC: 3.9 MG/DL (ref 0.6–1.1)
EOSINOPHIL # BLD AUTO: 0.4 10E3/UL (ref 0–0.7)
EOSINOPHIL NFR BLD AUTO: 8 %
ERYTHROCYTE [DISTWIDTH] IN BLOOD BY AUTOMATED COUNT: 14.5 % (ref 10–15)
GFR SERPL CREATININE-BSD FRML MDRD: 10 ML/MIN/1.73M2
GLUCOSE BLD-MCNC: 152 MG/DL (ref 70–125)
HBA1C MFR BLD: 6.2 %
HCT VFR BLD AUTO: 29.3 % (ref 35–47)
HGB BLD-MCNC: 9.5 G/DL (ref 11.7–15.7)
IMM GRANULOCYTES # BLD: 0 10E3/UL
IMM GRANULOCYTES NFR BLD: 1 %
LYMPHOCYTES # BLD AUTO: 1.5 10E3/UL (ref 0.8–5.3)
LYMPHOCYTES NFR BLD AUTO: 28 %
MCH RBC QN AUTO: 30.6 PG (ref 26.5–33)
MCHC RBC AUTO-ENTMCNC: 32.4 G/DL (ref 31.5–36.5)
MCV RBC AUTO: 95 FL (ref 78–100)
MONOCYTES # BLD AUTO: 0.5 10E3/UL (ref 0–1.3)
MONOCYTES NFR BLD AUTO: 8 %
NEUTROPHILS # BLD AUTO: 2.9 10E3/UL (ref 1.6–8.3)
NEUTROPHILS NFR BLD AUTO: 55 %
NRBC # BLD AUTO: 0 10E3/UL
NRBC BLD AUTO-RTO: 0 /100
PLATELET # BLD AUTO: 177 10E3/UL (ref 150–450)
POTASSIUM BLD-SCNC: 4.5 MMOL/L (ref 3.5–5)
RBC # BLD AUTO: 3.1 10E6/UL (ref 3.8–5.2)
SODIUM SERPL-SCNC: 140 MMOL/L (ref 136–145)
WBC # BLD AUTO: 5.3 10E3/UL (ref 4–11)

## 2021-10-11 PROCEDURE — P9603 ONE-WAY ALLOW PRORATED MILES: HCPCS | Mod: ORL | Performed by: FAMILY MEDICINE

## 2021-10-11 PROCEDURE — 36415 COLL VENOUS BLD VENIPUNCTURE: CPT | Mod: ORL | Performed by: FAMILY MEDICINE

## 2021-10-11 PROCEDURE — 80048 BASIC METABOLIC PNL TOTAL CA: CPT | Mod: ORL | Performed by: FAMILY MEDICINE

## 2021-10-11 PROCEDURE — 83036 HEMOGLOBIN GLYCOSYLATED A1C: CPT | Mod: ORL | Performed by: FAMILY MEDICINE

## 2021-10-11 PROCEDURE — 85025 COMPLETE CBC W/AUTO DIFF WBC: CPT | Mod: ORL | Performed by: FAMILY MEDICINE
